# Patient Record
Sex: FEMALE | Race: WHITE | NOT HISPANIC OR LATINO | URBAN - METROPOLITAN AREA
[De-identification: names, ages, dates, MRNs, and addresses within clinical notes are randomized per-mention and may not be internally consistent; named-entity substitution may affect disease eponyms.]

---

## 2023-12-06 PROBLEM — Z23 ENCOUNTER FOR IMMUNIZATION: Status: ACTIVE | Noted: 2023-12-06

## 2024-06-23 PROBLEM — L03.90 CELLULITIS: Status: ACTIVE | Noted: 2024-06-23

## 2024-07-22 ENCOUNTER — TELEPHONE (OUTPATIENT)
Dept: PRIMARY CARE | Facility: CLINIC | Age: 49
End: 2024-07-22
Payer: COMMERCIAL

## 2024-07-22 DIAGNOSIS — J41.0 SIMPLE CHRONIC BRONCHITIS (MULTI): ICD-10-CM

## 2024-07-22 RX ORDER — TIOTROPIUM BROMIDE 18 UG/1
1 CAPSULE ORAL; RESPIRATORY (INHALATION)
Qty: 90 CAPSULE | Refills: 1 | Status: SHIPPED | OUTPATIENT
Start: 2024-07-22 | End: 2025-01-18

## 2024-07-22 NOTE — TELEPHONE ENCOUNTER
Pt requesting refill    Tiotropium 18mcg daily  [phone]  Last refill 1/10/24  Last appt 1/10/24  Next appt 8/30/24

## 2024-08-30 ENCOUNTER — APPOINTMENT (OUTPATIENT)
Dept: PRIMARY CARE | Facility: CLINIC | Age: 49
End: 2024-08-30
Payer: COMMERCIAL